# Patient Record
Sex: MALE | HISPANIC OR LATINO | Employment: FULL TIME | URBAN - METROPOLITAN AREA
[De-identification: names, ages, dates, MRNs, and addresses within clinical notes are randomized per-mention and may not be internally consistent; named-entity substitution may affect disease eponyms.]

---

## 2024-03-12 ENCOUNTER — HOSPITAL ENCOUNTER (EMERGENCY)
Facility: CLINIC | Age: 29
Discharge: HOME | End: 2024-03-12
Attending: STUDENT IN AN ORGANIZED HEALTH CARE EDUCATION/TRAINING PROGRAM | Admitting: STUDENT IN AN ORGANIZED HEALTH CARE EDUCATION/TRAINING PROGRAM

## 2024-03-12 VITALS
SYSTOLIC BLOOD PRESSURE: 137 MMHG | RESPIRATION RATE: 18 BRPM | DIASTOLIC BLOOD PRESSURE: 89 MMHG | WEIGHT: 198.19 LBS | HEIGHT: 66 IN | TEMPERATURE: 98.5 F | HEART RATE: 79 BPM | OXYGEN SATURATION: 100 % | BODY MASS INDEX: 31.85 KG/M2

## 2024-03-12 DIAGNOSIS — J10.1 INFLUENZA B: ICD-10-CM

## 2024-03-12 LAB
FLUAV RNA SPEC QL NAA+PROBE: NEGATIVE
FLUBV RNA RESP QL NAA+PROBE: POSITIVE
GROUP A STREP BY PCR: NOT DETECTED
RSV RNA SPEC NAA+PROBE: NEGATIVE
SARS-COV-2 RNA RESP QL NAA+PROBE: NEGATIVE

## 2024-03-12 PROCEDURE — 87637 SARSCOV2&INF A&B&RSV AMP PRB: CPT | Performed by: STUDENT IN AN ORGANIZED HEALTH CARE EDUCATION/TRAINING PROGRAM

## 2024-03-12 PROCEDURE — 250N000013 HC RX MED GY IP 250 OP 250 PS 637: Performed by: STUDENT IN AN ORGANIZED HEALTH CARE EDUCATION/TRAINING PROGRAM

## 2024-03-12 PROCEDURE — 87651 STREP A DNA AMP PROBE: CPT | Performed by: STUDENT IN AN ORGANIZED HEALTH CARE EDUCATION/TRAINING PROGRAM

## 2024-03-12 PROCEDURE — 99283 EMERGENCY DEPT VISIT LOW MDM: CPT

## 2024-03-12 RX ORDER — IBUPROFEN 600 MG/1
600 TABLET, FILM COATED ORAL ONCE
Status: COMPLETED | OUTPATIENT
Start: 2024-03-12 | End: 2024-03-12

## 2024-03-12 RX ADMIN — Medication 10 MG: at 17:06

## 2024-03-12 RX ADMIN — IBUPROFEN 600 MG: 600 TABLET ORAL at 16:28

## 2024-03-12 ASSESSMENT — COLUMBIA-SUICIDE SEVERITY RATING SCALE - C-SSRS
6. HAVE YOU EVER DONE ANYTHING, STARTED TO DO ANYTHING, OR PREPARED TO DO ANYTHING TO END YOUR LIFE?: NO
2. HAVE YOU ACTUALLY HAD ANY THOUGHTS OF KILLING YOURSELF IN THE PAST MONTH?: NO
1. IN THE PAST MONTH, HAVE YOU WISHED YOU WERE DEAD OR WISHED YOU COULD GO TO SLEEP AND NOT WAKE UP?: NO

## 2024-03-12 ASSESSMENT — ACTIVITIES OF DAILY LIVING (ADL): ADLS_ACUITY_SCORE: 35

## 2024-03-12 NOTE — DISCHARGE INSTRUCTIONS
Take 1000 mg of Tylenol every ~6 hours for pain.  Do not exceed 4000 mg in 24 hours.  Take 600 mg of ibuprofen every 6-8 hours for pain.  Do not exceed 2400 mg in a 24-hour period.  Steroid you received here in the emergency department should help with your sore throat over the next 2 to 3 days.  Get plenty of rest and drink plenty of fluids.  You may also take Imodium, which is an over-the-counter medication, for diarrhea.  Return to the ED should you develop abdominal pain, blood in your stool, persistent vomiting or inability to tolerate oral intake, shortness of breath, confusion, or any further emergent concerns.  I hope you feel better soon!

## 2024-03-12 NOTE — ED PROVIDER NOTES
"  History     Chief Complaint:  Fever, Cough, and Diarrhea     HPI   Brodie Walker is a 29 year old male who presents to the emergency department with complaints of fever, cough, diarrhea, congestion for the past 5 days.  Symptoms started with cough followed by fever up to 104.  Diarrhea started yesterday.  Every time he eats he has a loose stool.  Denies blood in his stool.  Denies nausea or vomiting.  Denies abdominal pain, chest pain, or shortness of breath.  His throat was sore but he denies ear pain.  Patient reports being otherwise healthy and does not take any prescription medications.  No known sick contacts, but his girlfriend's baby currently has a runny nose.  He has been taking Tylenol and ibuprofen, with his last dose of ibuprofen being last night    Independent Historian:   Spouse/Partner - They report supplemental history    Review of External Notes:   None     Medications:    No current outpatient medications on file.      Past Medical History:    No past medical history on file.    Past Surgical History:    No past surgical history on file.     Physical Exam   Patient Vitals for the past 24 hrs:   BP Temp Temp src Pulse Resp SpO2 Height Weight   03/12/24 1547 137/89 98.5  F (36.9  C) Oral 79 18 100 % 1.676 m (5' 6\") 89.9 kg (198 lb 3.1 oz)        Physical Exam  Vital signs and nursing notes reviewed.    General:  Alert and oriented, no acute distress. Resting on bed with girlfriend at bedside.   Skin: Skin is warm and dry. No rashes, lesions, or erythema. No diaphoresis.  HEENT:   Head: Normocephalic, atraumatic. Facial features symmetric.   Eyes: Conjunctiva pink. EOMs grossly intact.   Ears: Auricles without lesion, erythema, or edema.   Nose: Symmetric with congestion  Mouth and throat: Lips are moist with no lesions or edema, oropharyngeal mucosa is hyperemic and moist without lesions or exudate. Uvula is midline.  Neck: Normal range of motion. Neck supple with no lymphadenopathy. No " tracheal deviation.   CV:  Heart RRR with no murmurs or extra heart sounds. 2+ radial and tibialis posterior pulses bilaterally. No peripheral edema.  Pulm/Chest: Chest wall expansion symmetric with no increased effort of breathing. Lungs clear and equal to auscultation bilaterally.   Abd: Bowel sounds present and physiologic. Abdomen is soft and nontender to palpation in all 4 quadrants with no guarding or rebound.   M/S: Moves all extremities spontaneously.  Psych: Normal mood and affect. Behavior is normal.     Emergency Department Course     Imaging:  No orders to display      Laboratory:  Labs Ordered and Resulted from Time of ED Arrival to Time of ED Departure   INFLUENZA A/B, RSV, & SARS-COV2 PCR - Abnormal       Result Value    Influenza A PCR Negative      Influenza B PCR Positive (*)     RSV PCR Negative      SARS CoV2 PCR Negative     GROUP A STREPTOCOCCUS PCR THROAT SWAB - Normal    Group A strep by PCR Not Detected          Procedures   none    Emergency Department Course & Assessments:    Interventions:  Medications   ibuprofen (ADVIL/MOTRIN) tablet 600 mg (has no administration in time range)      Independent Interpretation (X-rays, CTs, rhythm strip):  None    Consultations/Discussion of Management or Tests:  None     Assessments:  ED Course as of 03/12/24 1734   Tue Mar 12, 2024   1617 I initially assessed the patient and obtained the above history and physical exam.     1651 I reassessed the patient and updated them on results and plan of care.        Social Determinants of Health affecting care:   None    Disposition:  The patient was discharged.     Impression & Plan    CMS Diagnoses: None    MIPS (If applicable):  N/A    Medical Decision Making:  Brodie Walker is a 29 year old male who presents for evaluation of flulike symptoms with fever for the past few days.  See HPI.  Vital signs are reassuring.  On exam, he is nontoxic.  He has slightly hyperemic oropharynx but no tonsillar  enlargement, uvula deviation, or exudate.  Strep is negative. Influenza B testing is positive.  Bilateral tympanic membranes without evidence of otitis media.  Lungs are clear to auscultation without wheezing, crackles, or other adventitious lung sounds.  He is not short of breath.  Will not obtain chest x-ray as I have low suspicion for pneumonia at this time.  Abdomen benign.  Suspect loose stools are related to influenza.  No abdominal tenderness, no blood in his stool, and no vomiting, therefore I highly doubt sinister intra-abdominal pathology such as appendicitis, diverticulitis, bowel obstruction, etc.  History and presentation are consistent with influenza.  There is no evidence of sepsis, meningitis, or superimposed bacterial infection.  Using reasonable clinical judgment, I feel the patient is safe for discharge home.  He is unfortunately out of the window for Tamiflu.  He received a dose of Decadron here to help with his sore throat.  Discussed ongoing Tylenol and ibuprofen.  Also recommended Imodium over-the-counter to slow down bowel movements.  He is tolerating oral intake, is not hypoxic, and is nontoxic.  He will follow-up with primary care as needed and return to the ED should he develop shortness of breath, inability to tolerate oral intake, blood in his stool, abdominal pain, weakness or confusion, or any further concerns.  Patient and girlfriend in the room are agreeable to plan and had questions answered.    Diagnosis:    ICD-10-CM    1. Influenza B  J10.1          Discharge Medications:  New Prescriptions    No medications on file      Jackie Ram PA-C on 3/12/2024 at 5:41 PM       Jackie Ram PA-C  03/12/24 1741

## 2024-03-12 NOTE — ED TRIAGE NOTES
Presents to triage with c/o fever, cough, and diarrhea that has been ongoing since Friday. Patient stated fever was 104 yesterday but did not take temp today. No tylenol/ibuprofen PTA

## (undated) RX ORDER — IBUPROFEN 600 MG/1
TABLET, FILM COATED ORAL
Status: DISPENSED
Start: 2024-03-12